# Patient Record
Sex: FEMALE | Race: WHITE
[De-identification: names, ages, dates, MRNs, and addresses within clinical notes are randomized per-mention and may not be internally consistent; named-entity substitution may affect disease eponyms.]

---

## 2019-10-30 ENCOUNTER — HOSPITAL ENCOUNTER (INPATIENT)
Dept: HOSPITAL 35 - ER | Age: 49
LOS: 1 days | Discharge: HOME | DRG: 103 | End: 2019-10-31
Attending: INTERNAL MEDICINE | Admitting: INTERNAL MEDICINE
Payer: COMMERCIAL

## 2019-10-30 VITALS — SYSTOLIC BLOOD PRESSURE: 158 MMHG | DIASTOLIC BLOOD PRESSURE: 96 MMHG

## 2019-10-30 VITALS — DIASTOLIC BLOOD PRESSURE: 98 MMHG | SYSTOLIC BLOOD PRESSURE: 155 MMHG

## 2019-10-30 VITALS
DIASTOLIC BLOOD PRESSURE: 112 MMHG | SYSTOLIC BLOOD PRESSURE: 168 MMHG | WEIGHT: 160 LBS | HEIGHT: 65 IN | BODY MASS INDEX: 26.66 KG/M2

## 2019-10-30 VITALS — DIASTOLIC BLOOD PRESSURE: 90 MMHG | SYSTOLIC BLOOD PRESSURE: 144 MMHG

## 2019-10-30 DIAGNOSIS — I10: ICD-10-CM

## 2019-10-30 DIAGNOSIS — R73.9: ICD-10-CM

## 2019-10-30 DIAGNOSIS — E87.6: ICD-10-CM

## 2019-10-30 DIAGNOSIS — I95.9: ICD-10-CM

## 2019-10-30 DIAGNOSIS — G43.109: Primary | ICD-10-CM

## 2019-10-30 DIAGNOSIS — Z79.899: ICD-10-CM

## 2019-10-30 DIAGNOSIS — Z91.19: ICD-10-CM

## 2019-10-30 LAB
ANION GAP SERPL CALC-SCNC: 12 MMOL/L (ref 7–16)
BASOPHILS NFR BLD AUTO: 0.3 % (ref 0–2)
BILIRUB UR-MCNC: NEGATIVE MG/DL
BUN SERPL-MCNC: 11 MG/DL (ref 7–18)
CALCIUM SERPL-MCNC: 8.7 MG/DL (ref 8.5–10.1)
CHLORIDE SERPL-SCNC: 103 MMOL/L (ref 98–107)
CO2 SERPL-SCNC: 23 MMOL/L (ref 21–32)
COLOR UR: YELLOW
CREAT SERPL-MCNC: 1 MG/DL (ref 0.6–1)
EOSINOPHIL NFR BLD: 2.7 % (ref 0–3)
ERYTHROCYTE [DISTWIDTH] IN BLOOD BY AUTOMATED COUNT: 12.9 % (ref 10.5–14.5)
GLUCOSE SERPL-MCNC: 155 MG/DL (ref 74–106)
GRANULOCYTES NFR BLD MANUAL: 58.2 % (ref 36–66)
HCT VFR BLD CALC: 41.7 % (ref 37–47)
HGB BLD-MCNC: 14.2 GM/DL (ref 12–15)
KETONES UR STRIP-MCNC: NEGATIVE MG/DL
LYMPHOCYTES NFR BLD AUTO: 32 % (ref 24–44)
MAGNESIUM SERPL-MCNC: 1.9 MG/DL (ref 1.8–2.4)
MCH RBC QN AUTO: 30.1 PG (ref 26–34)
MCHC RBC AUTO-ENTMCNC: 34 G/DL (ref 28–37)
MCV RBC: 88.5 FL (ref 80–100)
MONOCYTES NFR BLD: 6.8 % (ref 1–8)
NEUTROPHILS # BLD: 6.7 THOU/UL (ref 1.4–8.2)
PLATELET # BLD: 304 THOU/UL (ref 150–400)
POTASSIUM SERPL-SCNC: 3 MMOL/L (ref 3.5–5.1)
RBC # BLD AUTO: 4.71 MIL/UL (ref 4.2–5)
RBC # UR STRIP: NEGATIVE /UL
SODIUM SERPL-SCNC: 138 MMOL/L (ref 136–145)
SP GR UR STRIP: 1.01 (ref 1–1.03)
URINE CLARITY: CLEAR
URINE GLUCOSE-RANDOM*: (no result)
URINE LEUKOCYTES-REFLEX: NEGATIVE
URINE NITRITE-REFLEX: NEGATIVE
URINE PROTEIN (DIPSTICK): NEGATIVE
UROBILINOGEN UR STRIP-ACNC: 0.2 E.U./DL (ref 0.2–1)
WBC # BLD AUTO: 11.6 THOU/UL (ref 4–11)

## 2019-10-30 PROCEDURE — 10045: CPT

## 2019-10-30 NOTE — NUR
New pt admitted from the ED with a dx of vertigo. pt is a&ox4. ambulates with
standby assist. pt c/o migrain and likes her room dark. pt was able to sleep
after she settled in her room. c/o of nausea; mediacted per emar. no
vomiting. fall prec in place. call light within reach. v/s stable. no s/s of
dstress. will cont to monitor

## 2019-10-31 VITALS — SYSTOLIC BLOOD PRESSURE: 139 MMHG | DIASTOLIC BLOOD PRESSURE: 87 MMHG

## 2019-10-31 VITALS — DIASTOLIC BLOOD PRESSURE: 85 MMHG | SYSTOLIC BLOOD PRESSURE: 135 MMHG

## 2019-10-31 VITALS — DIASTOLIC BLOOD PRESSURE: 97 MMHG | SYSTOLIC BLOOD PRESSURE: 150 MMHG

## 2019-10-31 NOTE — EKG
[address] [place]  [address]
Phone:  [phone]                    ELECTROCARDIOGRAM REPORT      
_______________________________________________________________________________
 
Name:       MIKAYLA SKAGGS ADRIA            Room #:         460-P       ADM IN  
M.R.#:      [id number]     Account #:      [id number]  
Admission:  10/30/19    Attend Phys:    Bud Marshall MD   
Discharge:              Date of Birth:  [birth year]  
                                                          Report #: [id number]
   [id number]
_______________________________________________________________________________
THIS REPORT FOR:   //name//                          
 
                         [facility] ED
                                       
Test Date:    2019-10-30               Test Time:    12:28:12
Pat Name:     MIKAYLA SKAGGS            Department:   
Patient ID:   [id number]            Room:         460
Gender:       F                        Technician:   kenton
:          1970               Requested By: Greg Shah
Order Number: [id number] MD:   Jonathan Givens
                                 Measurements
Intervals                              Axis          
Rate:         72                       P:            37
MT:           171                      QRS:          15
QRSD:         100                      T:            24
QT:           419                                    
QTc:          459                                    
                           Interpretive Statements
Sinus rhythm
Nonspecific ST segment abnormality
No previous ECG available for comparison
 
Electronically Signed On 10- 8:37:10 CDT by Jonathan Givens
[email or web address]
 
 
 
 
 
 
 
 
 
 
 
 
 
 
 
 
 
 
 
  <ELECTRONICALLY SIGNED>
   By: Jonathan Givens MD, [facility]   
  10/31/19     0837
D: 10/30/19 1228                           _____________________________________
T: 10/30/19 1228                           Jonathan Givens MD, FACC     /EPI

## 2019-10-31 NOTE — NUR
PT ADMITTED RELATED TO ABDOMINAL PAIN. CM REVIEWED CHART AND SPOKE WITH CARE
TEAM. CM MET WITH PT AND SPOUSE AT BEDSIDE THIS DAY. PT IS A&O X4. CM ROLE
INTRODUCED. PT INDICATED THAT SHE LIVES IN A HOUSE WITH HER SPOUSE WITH A
COUPLE STEPS TO ENTER AND ALL NEEDS ON 1 LEVEL UPON DC. PT INDICATED SHE HAD
BEEN INDPEDNENET WITH GAIT AND ADLS PTA. PT INDICATED NO DME OR HH HX. PT
INDICATED THAT SHE PLANS PT RETURN HOME ONCE MEDICALLY STABLE. CM TO FOLLOW
AS INDICATED WITH DC PLANNING.

## 2019-10-31 NOTE — NUR
Assumed patient care at 0715. Patient has had no nausea and/or vomiting. She
has required no sliding scale Insulin. Dr Marshall came to visit patient, agreed
to Discharge patient after she walked the hallway with minimal assist; she did
so. Vital signs have been within normal limits, except for blood pressures.
Patient verbalized an understanding education regarding this as well as all
Discharge Instructions before signing them. Patient left in wheelchair with
Escort and  at approximately 1735.

## 2019-10-31 NOTE — NUR
SHANEL NOTIFIED BY CM SUPERVISOR THAT DR MILLER STATED PT WILL DC HOME TODAY. NO
ANTICIPATED NEEDS. SHANEL SPOKE WITH BEDSIDE NURSE RT DC TODAY.